# Patient Record
Sex: FEMALE | Race: WHITE | NOT HISPANIC OR LATINO | Employment: FULL TIME | URBAN - METROPOLITAN AREA
[De-identification: names, ages, dates, MRNs, and addresses within clinical notes are randomized per-mention and may not be internally consistent; named-entity substitution may affect disease eponyms.]

---

## 2017-10-31 ENCOUNTER — OFFICE VISIT (OUTPATIENT)
Dept: FAMILY MEDICINE CLINIC | Facility: CLINIC | Age: 27
End: 2017-10-31

## 2017-10-31 VITALS
HEIGHT: 68 IN | HEART RATE: 80 BPM | RESPIRATION RATE: 18 BRPM | BODY MASS INDEX: 35.84 KG/M2 | SYSTOLIC BLOOD PRESSURE: 138 MMHG | TEMPERATURE: 98.6 F | WEIGHT: 236.5 LBS | DIASTOLIC BLOOD PRESSURE: 82 MMHG | OXYGEN SATURATION: 96 %

## 2017-10-31 DIAGNOSIS — R19.5 MUCUS IN STOOL: ICD-10-CM

## 2017-10-31 DIAGNOSIS — R19.7 INTERMITTENT DIARRHEA: ICD-10-CM

## 2017-10-31 DIAGNOSIS — R63.5 ABNORMAL WEIGHT GAIN: Primary | ICD-10-CM

## 2017-10-31 DIAGNOSIS — Z23 NEED FOR TDAP VACCINATION: ICD-10-CM

## 2017-10-31 DIAGNOSIS — K92.1: ICD-10-CM

## 2017-10-31 PROCEDURE — 99204 OFFICE O/P NEW MOD 45 MIN: CPT | Performed by: NURSE PRACTITIONER

## 2017-10-31 PROCEDURE — 90471 IMMUNIZATION ADMIN: CPT | Performed by: NURSE PRACTITIONER

## 2017-10-31 PROCEDURE — 90715 TDAP VACCINE 7 YRS/> IM: CPT | Performed by: NURSE PRACTITIONER

## 2017-10-31 RX ORDER — NORETHINDRONE ACETATE AND ETHINYL ESTRADIOL AND FERROUS FUMARATE 1MG-20(24)
1 KIT ORAL DAILY
COMMUNITY
End: 2021-01-04

## 2017-10-31 NOTE — PROGRESS NOTES
Subjective   Ailyn Serrano is a 26 y.o. female.     History of Present Illness   Ailyn Serrano 26 y.o. female who presents for evaluation of intermittent diarrhea and bloating. Symptoms have been present for a few months .  The condition is aggravated by nothing identified . she is experiencing mucus in stools and blood in stool.  Alleviating factors are nothing with no change in symptoms . Patient denies fever, chills, GI symptoms waking them up, abdominal pain, nausea, reflux and vomiting her past medical history is notable for no prior issures.  Patient denies recent travel.  Patient reports father has irritable bowel issues.  Patient reports intermittent episodes of diarrhea, mucus or yousif blood in stool but symptoms do not always occur at the same time.  Her stool is more loose in general even when she is not having diarrhea.      Patient reports she has gained 80 lbs since graduating [employer].  She reports working out regularly.  She has been doing boot camp for 8 weeks. 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  She has not been able to lose weight. She would like to have thyroid checked.     Last PAP was July 2017 and was normal.  LMP was last week.   The following portions of the patient's history were reviewed and updated as appropriate: allergies, current medications, past family history, past medical history, past social history, past surgical history and problem list.    Review of Systems   Constitutional: Positive for unexpected weight change. Negative for chills and fever.   Gastrointestinal: Positive for blood in stool and diarrhea. Negative for abdominal distention, abdominal pain, anal bleeding, constipation, nausea, rectal pain and vomiting.       Objective   Physical Exam   Constitutional: She is oriented to person, place, and time. She appears well-developed and well-nourished.   Neck: Carotid bruit is not present. No thyroid mass present.   Cardiovascular: Normal rate and regular  rhythm.    Pulmonary/Chest: Effort normal and breath sounds normal.   Neurological: She is alert and oriented to person, place, and time.   Psychiatric: She has a normal mood and affect. Judgment normal.   Nursing note and vitals reviewed.      Assessment/Plan   Ailyn was seen today for gi problem and diarrhea.    Diagnoses and all orders for this visit:    Abnormal weight gain  -     Comprehensive metabolic panel  -     Lipid panel  -     CBC and Differential  -     TSH  -     T4, Free    Need for Tdap vaccination  -     Tdap Vaccine Greater Than or Equal To 8yo IM    Intermittent diarrhea  -     Ambulatory Referral to Gastroenterology    Mucus in stool  -     Ambulatory Referral to Gastroenterology    Blood in stool, yousif  -     Ambulatory Referral to Gastroenterology

## 2017-11-04 LAB
ALBUMIN SERPL-MCNC: 4.1 G/DL (ref 3.5–5.2)
ALBUMIN/GLOB SERPL: 1.6 G/DL
ALP SERPL-CCNC: 70 U/L (ref 39–117)
ALT SERPL-CCNC: 36 U/L (ref 1–33)
AST SERPL-CCNC: 22 U/L (ref 1–32)
BASOPHILS # BLD AUTO: 0.04 10*3/MM3 (ref 0–0.2)
BASOPHILS NFR BLD AUTO: 0.6 % (ref 0–1.5)
BILIRUB SERPL-MCNC: 0.4 MG/DL (ref 0.1–1.2)
BUN SERPL-MCNC: 11 MG/DL (ref 6–20)
BUN/CREAT SERPL: 17.2 (ref 7–25)
CALCIUM SERPL-MCNC: 9.2 MG/DL (ref 8.6–10.5)
CHLORIDE SERPL-SCNC: 103 MMOL/L (ref 98–107)
CHOLEST SERPL-MCNC: 153 MG/DL (ref 0–200)
CO2 SERPL-SCNC: 25.1 MMOL/L (ref 22–29)
CREAT SERPL-MCNC: 0.64 MG/DL (ref 0.57–1)
EOSINOPHIL # BLD AUTO: 0.08 10*3/MM3 (ref 0–0.7)
EOSINOPHIL NFR BLD AUTO: 1.2 % (ref 0.3–6.2)
ERYTHROCYTE [DISTWIDTH] IN BLOOD BY AUTOMATED COUNT: 13.7 % (ref 11.7–13)
GFR SERPLBLD CREATININE-BSD FMLA CKD-EPI: 114 ML/MIN/1.73
GFR SERPLBLD CREATININE-BSD FMLA CKD-EPI: 138 ML/MIN/1.73
GLOBULIN SER CALC-MCNC: 2.6 GM/DL
GLUCOSE SERPL-MCNC: 92 MG/DL (ref 65–99)
HCT VFR BLD AUTO: 38 % (ref 35.6–45.5)
HDLC SERPL-MCNC: 56 MG/DL (ref 40–60)
HGB BLD-MCNC: 12 G/DL (ref 11.9–15.5)
IMM GRANULOCYTES # BLD: 0 10*3/MM3 (ref 0–0.03)
IMM GRANULOCYTES NFR BLD: 0 % (ref 0–0.5)
LDLC SERPL CALC-MCNC: 82 MG/DL (ref 0–100)
LYMPHOCYTES # BLD AUTO: 1.74 10*3/MM3 (ref 0.9–4.8)
LYMPHOCYTES NFR BLD AUTO: 27 % (ref 19.6–45.3)
MCH RBC QN AUTO: 28.7 PG (ref 26.9–32)
MCHC RBC AUTO-ENTMCNC: 31.6 G/DL (ref 32.4–36.3)
MCV RBC AUTO: 90.9 FL (ref 80.5–98.2)
MONOCYTES # BLD AUTO: 0.56 10*3/MM3 (ref 0.2–1.2)
MONOCYTES NFR BLD AUTO: 8.7 % (ref 5–12)
NEUTROPHILS # BLD AUTO: 4.03 10*3/MM3 (ref 1.9–8.1)
NEUTROPHILS NFR BLD AUTO: 62.5 % (ref 42.7–76)
PLATELET # BLD AUTO: 256 10*3/MM3 (ref 140–500)
POTASSIUM SERPL-SCNC: 4.1 MMOL/L (ref 3.5–5.2)
PROT SERPL-MCNC: 6.7 G/DL (ref 6–8.5)
RBC # BLD AUTO: 4.18 10*6/MM3 (ref 3.9–5.2)
SODIUM SERPL-SCNC: 139 MMOL/L (ref 136–145)
T4 FREE SERPL-MCNC: 1.17 NG/DL (ref 0.93–1.7)
TRIGL SERPL-MCNC: 74 MG/DL (ref 0–150)
TSH SERPL DL<=0.005 MIU/L-ACNC: 2.56 MIU/ML (ref 0.27–4.2)
VLDLC SERPL CALC-MCNC: 14.8 MG/DL (ref 5–40)
WBC # BLD AUTO: 6.45 10*3/MM3 (ref 4.5–10.7)

## 2017-11-15 ENCOUNTER — OFFICE VISIT (OUTPATIENT)
Dept: GASTROENTEROLOGY | Facility: CLINIC | Age: 27
End: 2017-11-15

## 2017-11-15 VITALS
DIASTOLIC BLOOD PRESSURE: 70 MMHG | HEIGHT: 68 IN | WEIGHT: 233 LBS | SYSTOLIC BLOOD PRESSURE: 110 MMHG | TEMPERATURE: 98 F | BODY MASS INDEX: 35.31 KG/M2

## 2017-11-15 DIAGNOSIS — R10.9 ABDOMINAL CRAMPING: ICD-10-CM

## 2017-11-15 DIAGNOSIS — K59.1 FUNCTIONAL DIARRHEA: Primary | ICD-10-CM

## 2017-11-15 DIAGNOSIS — K92.1 HEMATOCHEZIA: ICD-10-CM

## 2017-11-15 DIAGNOSIS — R14.0 BLOATING: ICD-10-CM

## 2017-11-15 PROCEDURE — 99204 OFFICE O/P NEW MOD 45 MIN: CPT | Performed by: INTERNAL MEDICINE

## 2017-11-15 RX ORDER — DICYCLOMINE HYDROCHLORIDE 10 MG/1
10 CAPSULE ORAL EVERY 6 HOURS PRN
Qty: 90 CAPSULE | Refills: 1 | Status: SHIPPED | OUTPATIENT
Start: 2017-11-15 | End: 2019-03-27

## 2017-11-15 NOTE — PROGRESS NOTES
Chief Complaint   Patient presents with   • Diarrhea     Referral   • Rectal Bleeding     two times       Subjective     HPI    Ailyn Serrano is a 26 y.o. female with a past medical history noted below who presents for evaluation of diarrhea and rectal bleeding. Symptoms started about 3 months ago with no obvious precipitants.  Her baseline was to have about 1 bowel movement every day.  There is no hard stools or constipation.  However this progressed to about 2-3 bowel movements daily.  There is some urgency.  She is now having a bowel movement frequently after lunchtime.  She is having a little bit of lower abdominal cramping preceding the bowel movements that is relieved afterwards.  She has had some significant mucus with her stools.  On 2 discrete episodes, she did notice some blood in her stool and with wiping.  It was a small amount.  It was not associated with any pain or any particular Lex hard bowel movement.  She has not had any further.  She did recently have lab work with her primary care physician her hemoglobin was normal.    Not take any excess NSAIDs or herbal supplements.  She is not had any abdominal surgeries.  She works as a inpatient dietitian.  No family history of GI malignancy or IBD.  she does not smoke or drink excess alcohol.        History reviewed. No pertinent past medical history.      Current Outpatient Prescriptions:   •  norethindrone-ethinyl estradiol-ferrous fumarate (LOESTIN 24 FE) 1-20 MG-MCG(24) per tablet, Take 1 tablet by mouth Daily., Disp: , Rfl:   •  dicyclomine (BENTYL) 10 MG capsule, Take 1 capsule by mouth Every 6 (Six) Hours As Needed (diarrhea)., Disp: 90 capsule, Rfl: 1    No Known Allergies    Social History     Social History   • Marital status:      Spouse name: N/A   • Number of children: N/A   • Years of education: N/A     Occupational History   • Not on file.     Social History Main Topics   • Smoking status: Never Smoker   • Smokeless tobacco: Never  Used   • Alcohol use Yes   • Drug use: No   • Sexual activity: Not on file     Other Topics Concern   • Not on file     Social History Narrative       Family History   Problem Relation Age of Onset   • Nephrolithiasis Father    • Hypertension Father    • Heart disease Other    • Cancer Other    • Lung disease Other    • Depression Other    • Alcohol abuse Other        Review of Systems   Constitutional: Negative for activity change, appetite change and fatigue.   HENT: Negative for sore throat and trouble swallowing.    Respiratory: Negative.    Cardiovascular: Negative.    Gastrointestinal: Positive for anal bleeding and diarrhea. Negative for abdominal distention, abdominal pain and blood in stool.   Endocrine: Negative for cold intolerance and heat intolerance.   Genitourinary: Negative for difficulty urinating, dysuria and frequency.   Musculoskeletal: Negative for arthralgias, back pain and myalgias.   Skin: Negative.    Hematological: Negative for adenopathy. Does not bruise/bleed easily.   All other systems reviewed and are negative.      Objective     Vitals:    11/15/17 1607   BP: 110/70   Temp: 98 °F (36.7 °C)     Last 2 weights    11/15/17  1607   Weight: 233 lb (106 kg)     Body mass index is 35.43 kg/(m^2).    Physical Exam   Constitutional: She is oriented to person, place, and time. She appears well-developed and well-nourished. No distress.   HENT:   Head: Normocephalic and atraumatic.   Right Ear: External ear normal.   Left Ear: External ear normal.   Nose: Nose normal.   Mouth/Throat: Oropharynx is clear and moist.   Eyes: Conjunctivae and EOM are normal. Right eye exhibits no discharge. Left eye exhibits no discharge. No scleral icterus.   Neck: Normal range of motion. Neck supple. No thyromegaly present.   No supraclavicular adenopathy   Cardiovascular: Normal rate, regular rhythm, normal heart sounds and intact distal pulses.  Exam reveals no gallop.    No murmur heard.  No lower extremity  edema   Pulmonary/Chest: Effort normal and breath sounds normal. No respiratory distress. She has no wheezes.   Abdominal: Soft. Normal appearance and bowel sounds are normal. She exhibits no distension and no mass. There is no hepatosplenomegaly. There is no tenderness. There is no rigidity, no rebound and no guarding.   Obese   Genitourinary:   Genitourinary Comments: Rectal exam with no external hemorrhoids, no palpable internal masses, no blood   Musculoskeletal: Normal range of motion. She exhibits no edema or tenderness.   No atrophy of upper or lower extremities.  Normal digits and nails of both hands.   Lymphadenopathy:     She has no cervical adenopathy.   Neurological: She is alert and oriented to person, place, and time. She displays no atrophy. Coordination normal.   Skin: Skin is warm and dry. No rash noted. She is not diaphoretic. No erythema.   Psychiatric: She has a normal mood and affect. Her behavior is normal. Judgment and thought content normal.   Vitals reviewed.      WBC   Date Value Ref Range Status   11/03/2017 6.45 4.50 - 10.70 10*3/mm3 Final     RBC   Date Value Ref Range Status   11/03/2017 4.18 3.90 - 5.20 10*6/mm3 Final     Hemoglobin   Date Value Ref Range Status   11/03/2017 12.0 11.9 - 15.5 g/dL Final     Hematocrit   Date Value Ref Range Status   11/03/2017 38.0 35.6 - 45.5 % Final     MCV   Date Value Ref Range Status   11/03/2017 90.9 80.5 - 98.2 fL Final     MCH   Date Value Ref Range Status   11/03/2017 28.7 26.9 - 32.0 pg Final     MCHC   Date Value Ref Range Status   11/03/2017 31.6 (L) 32.4 - 36.3 g/dL Final     RDW   Date Value Ref Range Status   11/03/2017 13.7 (H) 11.7 - 13.0 % Final     Platelets   Date Value Ref Range Status   11/03/2017 256 140 - 500 10*3/mm3 Final     Neutrophil Rel %   Date Value Ref Range Status   11/03/2017 62.5 42.7 - 76.0 % Final     Lymphocyte Rel %   Date Value Ref Range Status   11/03/2017 27.0 19.6 - 45.3 % Final     Monocyte Rel %   Date Value  Ref Range Status   11/03/2017 8.7 5.0 - 12.0 % Final     Eosinophil Rel %   Date Value Ref Range Status   11/03/2017 1.2 0.3 - 6.2 % Final     Basophil Rel %   Date Value Ref Range Status   11/03/2017 0.6 0.0 - 1.5 % Final     Neutrophils Absolute   Date Value Ref Range Status   11/03/2017 4.03 1.90 - 8.10 10*3/mm3 Final     Lymphocytes Absolute   Date Value Ref Range Status   11/03/2017 1.74 0.90 - 4.80 10*3/mm3 Final     Monocytes Absolute   Date Value Ref Range Status   11/03/2017 0.56 0.20 - 1.20 10*3/mm3 Final     Eosinophils Absolute   Date Value Ref Range Status   11/03/2017 0.08 0.00 - 0.70 10*3/mm3 Final     Basophils Absolute   Date Value Ref Range Status   11/03/2017 0.04 0.00 - 0.20 10*3/mm3 Final       Sodium   Date Value Ref Range Status   11/03/2017 139 136 - 145 mmol/L Final     Potassium   Date Value Ref Range Status   11/03/2017 4.1 3.5 - 5.2 mmol/L Final     Total CO2   Date Value Ref Range Status   11/03/2017 25.1 22.0 - 29.0 mmol/L Final     Chloride   Date Value Ref Range Status   11/03/2017 103 98 - 107 mmol/L Final     Creatinine   Date Value Ref Range Status   11/03/2017 0.64 0.57 - 1.00 mg/dL Final     BUN   Date Value Ref Range Status   11/03/2017 11 6 - 20 mg/dL Final     BUN/Creatinine Ratio   Date Value Ref Range Status   11/03/2017 17.2 7.0 - 25.0 Final     Calcium   Date Value Ref Range Status   11/03/2017 9.2 8.6 - 10.5 mg/dL Final     eGFR Non  Am   Date Value Ref Range Status   11/03/2017 114 >60 mL/min/1.73 Final     Alkaline Phosphatase   Date Value Ref Range Status   11/03/2017 70 39 - 117 U/L Final     ALT (SGPT)   Date Value Ref Range Status   11/03/2017 36 (H) 1 - 33 U/L Final     AST (SGOT)   Date Value Ref Range Status   11/03/2017 22 1 - 32 U/L Final     Total Bilirubin   Date Value Ref Range Status   11/03/2017 0.4 0.1 - 1.2 mg/dL Final     Albumin   Date Value Ref Range Status   11/03/2017 4.10 3.50 - 5.20 g/dL Final     A/G Ratio   Date Value Ref Range Status    11/03/2017 1.6 g/dL Final         Imaging Results (last 7 days)     ** No results found for the last 168 hours. **            No notes on file    Assessment/Plan    1. Diarrhea: more frequent stools that are an acute change from her baseline.  ? IBS vs colitis  2. Abdominal cramping: associated with above, precedes her BMs and resolves after  3. Hematochezia: 2 separate episodes of blood in her stool during this time, recent Hb with PCP was normal  4. Bloating: associated with above    Plan  -start low dose dicyclomine  -daily fiber supplementation  -monitor stools for further bleeding-- if this persists will need colonoscopy for further evaluation  -Back to the office in about 3 months to reassess     Ailyn was seen today for diarrhea and rectal bleeding.    Diagnoses and all orders for this visit:    Functional diarrhea    Bloating    Abdominal cramping    Hematochezia    Other orders  -     dicyclomine (BENTYL) 10 MG capsule; Take 1 capsule by mouth Every 6 (Six) Hours As Needed (diarrhea).        I have discussed the above plan with the patient.  They verbalize understanding and are in agreement with the plan.  They have been advised to contact the office for any questions, concerns, or changes related to their health.    Dictated utilizing Dragon dictation

## 2017-11-15 NOTE — PATIENT INSTRUCTIONS
Start the dicyclomine for the diarrhea-- start with one every morning to see if this settles you down.  Can take every 6 hours as needed    Start citrucel tablets-- 2 with breakfast and dinner    Plenty of water    Monitor stools for further bleeding    For any additional questions, concerns or changes to your condition after today's office visit please contact the office at [phone].

## 2018-01-18 ENCOUNTER — OFFICE VISIT (OUTPATIENT)
Dept: FAMILY MEDICINE CLINIC | Facility: CLINIC | Age: 28
End: 2018-01-18

## 2018-01-18 VITALS
DIASTOLIC BLOOD PRESSURE: 72 MMHG | SYSTOLIC BLOOD PRESSURE: 130 MMHG | OXYGEN SATURATION: 98 % | BODY MASS INDEX: 35.77 KG/M2 | HEART RATE: 85 BPM | RESPIRATION RATE: 18 BRPM | TEMPERATURE: 98.5 F | HEIGHT: 68 IN | WEIGHT: 236 LBS

## 2018-01-18 DIAGNOSIS — R20.8 DECREASED SENSATION OF LEG: Primary | ICD-10-CM

## 2018-01-18 PROCEDURE — 99212 OFFICE O/P EST SF 10 MIN: CPT | Performed by: NURSE PRACTITIONER

## 2018-01-18 NOTE — PROGRESS NOTES
Subjective   Ailyn Serrano is a 27 y.o. female.     History of Present Illness   Patient complains of left anterior lower leg numbness.  She denies pain.  The symptoms began 3 days ago.  Pain is a result of no known event.  Discomfort is described as numbness and decreased sensation of touch. Symptoms are exacerbated by nothing identified.  Evaluation to date: none. Therapy to date includes: nothing specific.  Patient is doing boot camp and reports exercises are constantly changing.        The following portions of the patient's history were reviewed and updated as appropriate: allergies, current medications, past family history, past medical history, past social history, past surgical history and problem list.    Review of Systems   Musculoskeletal: Negative for arthralgias, back pain, gait problem, joint swelling and myalgias.   Neurological: Positive for numbness. Negative for weakness.       Objective   Physical Exam   Constitutional: She is oriented to person, place, and time. She appears well-developed and well-nourished.   Pulmonary/Chest: Effort normal.   Musculoskeletal:        Left lower leg: She exhibits no tenderness, no bony tenderness, no swelling, no edema, no deformity and no laceration.        Legs:  Patient reports slightly decreased sensation to touch to 5 cm area to left lateral lower extremity just below patella. No pain with ROM or palpation.    Neurological: She is alert and oriented to person, place, and time.   Psychiatric: She has a normal mood and affect. Judgment normal.   Nursing note and vitals reviewed.      Assessment/Plan   Ailyn was seen today for numbness.    Diagnoses and all orders for this visit:    Decreased sensation of leg        Patient to take motrin and rest leg for a few days.  She is to f/u if no improvement in symptoms.

## 2018-02-23 ENCOUNTER — OFFICE VISIT (OUTPATIENT)
Dept: GASTROENTEROLOGY | Facility: CLINIC | Age: 28
End: 2018-02-23

## 2018-02-23 VITALS
HEIGHT: 68 IN | WEIGHT: 232.4 LBS | TEMPERATURE: 97.9 F | SYSTOLIC BLOOD PRESSURE: 142 MMHG | DIASTOLIC BLOOD PRESSURE: 90 MMHG | BODY MASS INDEX: 35.22 KG/M2

## 2018-02-23 DIAGNOSIS — R10.9 ABDOMINAL CRAMPING: ICD-10-CM

## 2018-02-23 DIAGNOSIS — K62.5 RECTAL BLEEDING: ICD-10-CM

## 2018-02-23 DIAGNOSIS — K59.1 FUNCTIONAL DIARRHEA: Primary | ICD-10-CM

## 2018-02-23 PROCEDURE — 99214 OFFICE O/P EST MOD 30 MIN: CPT | Performed by: INTERNAL MEDICINE

## 2018-02-23 NOTE — PATIENT INSTRUCTIONS
Continue the dicyclomine-- ok to use every 6 hours as needed    Call if symptoms worsen or bleeding returns    For any additional questions, concerns or changes to your condition after today's office visit please contact the office at [phone].

## 2018-02-23 NOTE — PROGRESS NOTES
Chief Complaint   Patient presents with   • Follow-up     3 month follow up      Subjective     HPI  Ailyn Serrano is a 27 y.o. female who presents for follow-up of diarrhea, abdominal cramping, and a few intermittent episodes of rectal bleeding.  At her last visit I had her start fiber supplementation and dicyclomine for her symptoms.    Taking the dicyclomine once in the morning.  He is not experiencing any bothersome side effects from this  Also taking citrucel.  Doing well.  She is down to one to 2-controlled bowel movements per day.  She is not having any significant cramping.  She is not having any mucous passage as she was previously.  She is not having any abdominal pain, nausea, or vomiting.  She is not describing any excess issues with constipation.  She has not had any further episodes of rectal bleeding since her last visit 3 months ago.        History reviewed. No pertinent past medical history.    Social History     Social History   • Marital status:      Spouse name: N/A   • Number of children: N/A   • Years of education: N/A     Social History Main Topics   • Smoking status: Never Smoker   • Smokeless tobacco: Never Used   • Alcohol use Yes     2 Glasses of wine per week   • Drug use: No   • Sexual activity: Yes     Partners: Male     Birth control/ protection: Condom, Other      Comment: Birth control     Other Topics Concern   • None     Social History Narrative         Current Outpatient Prescriptions:   •  dicyclomine (BENTYL) 10 MG capsule, Take 1 capsule by mouth Every 6 (Six) Hours As Needed (diarrhea)., Disp: 90 capsule, Rfl: 1  •  norethindrone-ethinyl estradiol-ferrous fumarate (LOESTIN 24 FE) 1-20 MG-MCG(24) per tablet, Take 1 tablet by mouth Daily., Disp: , Rfl:     Review of Systems   Constitutional: Negative for activity change, appetite change, chills, fatigue and fever.   HENT: Negative for sore throat and trouble swallowing.    Respiratory: Negative.    Cardiovascular:  Negative.    Gastrointestinal: Negative for abdominal distention, abdominal pain, anal bleeding, blood in stool, constipation, diarrhea, nausea and vomiting.   Endocrine: Negative for cold intolerance and heat intolerance.   Genitourinary: Negative for difficulty urinating, dysuria, frequency and hematuria.   Musculoskeletal: Negative for arthralgias, back pain and myalgias.   Skin: Negative.    Hematological: Negative for adenopathy. Does not bruise/bleed easily.   All other systems reviewed and are negative.      Objective   Vitals:    02/23/18 1345   BP: 142/90   Temp: 97.9 °F (36.6 °C)     Last Weight    02/23/18  1345   Weight: 105 kg (232 lb 6.4 oz)     Body mass index is 35.34 kg/(m^2).      Physical Exam   Constitutional: She is oriented to person, place, and time. She appears well-developed and well-nourished. No distress.   HENT:   Head: Normocephalic and atraumatic.   Right Ear: External ear normal.   Left Ear: External ear normal.   Nose: Nose normal.   Mouth/Throat: Oropharynx is clear and moist.   Eyes: Conjunctivae and EOM are normal. Right eye exhibits no discharge. Left eye exhibits no discharge. No scleral icterus.   Neck: Normal range of motion. Neck supple. No thyromegaly present.   No supraclavicular adenopathy   Cardiovascular: Normal rate, regular rhythm, normal heart sounds and intact distal pulses.  Exam reveals no gallop.    No murmur heard.  No lower extremity edema   Pulmonary/Chest: Effort normal and breath sounds normal. No respiratory distress. She has no wheezes.   Abdominal: Soft. Normal appearance and bowel sounds are normal. She exhibits no distension and no mass. There is no hepatosplenomegaly. There is no tenderness. There is no rigidity, no rebound and no guarding. No hernia.   Obese     Genitourinary:   Genitourinary Comments: Rectal exam deferred   Musculoskeletal: Normal range of motion. She exhibits no edema or tenderness.   No atrophy of upper or lower extremities.  Normal  digits and nails of both hands.   Lymphadenopathy:     She has no cervical adenopathy.   Neurological: She is alert and oriented to person, place, and time. She displays no atrophy. Coordination normal.   Skin: Skin is warm and dry. No rash noted. She is not diaphoretic. No erythema.   Psychiatric: She has a normal mood and affect. Her behavior is normal. Judgment and thought content normal.   Vitals reviewed.      WBC   Date Value Ref Range Status   11/03/2017 6.45 4.50 - 10.70 10*3/mm3 Final     RBC   Date Value Ref Range Status   11/03/2017 4.18 3.90 - 5.20 10*6/mm3 Final     Hemoglobin   Date Value Ref Range Status   11/03/2017 12.0 11.9 - 15.5 g/dL Final     Hematocrit   Date Value Ref Range Status   11/03/2017 38.0 35.6 - 45.5 % Final     MCV   Date Value Ref Range Status   11/03/2017 90.9 80.5 - 98.2 fL Final     MCH   Date Value Ref Range Status   11/03/2017 28.7 26.9 - 32.0 pg Final     MCHC   Date Value Ref Range Status   11/03/2017 31.6 (L) 32.4 - 36.3 g/dL Final     RDW   Date Value Ref Range Status   11/03/2017 13.7 (H) 11.7 - 13.0 % Final     Platelets   Date Value Ref Range Status   11/03/2017 256 140 - 500 10*3/mm3 Final     Neutrophil Rel %   Date Value Ref Range Status   11/03/2017 62.5 42.7 - 76.0 % Final     Lymphocyte Rel %   Date Value Ref Range Status   11/03/2017 27.0 19.6 - 45.3 % Final     Monocyte Rel %   Date Value Ref Range Status   11/03/2017 8.7 5.0 - 12.0 % Final     Eosinophil Rel %   Date Value Ref Range Status   11/03/2017 1.2 0.3 - 6.2 % Final     Basophil Rel %   Date Value Ref Range Status   11/03/2017 0.6 0.0 - 1.5 % Final     Neutrophils Absolute   Date Value Ref Range Status   11/03/2017 4.03 1.90 - 8.10 10*3/mm3 Final     Lymphocytes Absolute   Date Value Ref Range Status   11/03/2017 1.74 0.90 - 4.80 10*3/mm3 Final     Monocytes Absolute   Date Value Ref Range Status   11/03/2017 0.56 0.20 - 1.20 10*3/mm3 Final     Eosinophils Absolute   Date Value Ref Range Status    11/03/2017 0.08 0.00 - 0.70 10*3/mm3 Final     Basophils Absolute   Date Value Ref Range Status   11/03/2017 0.04 0.00 - 0.20 10*3/mm3 Final       Lab Results   Component Value Date    BUN 11 11/03/2017    CREATININE 0.64 11/03/2017    EGFRIFNONA 114 11/03/2017    EGFRIFAFRI 138 11/03/2017    BCR 17.2 11/03/2017    CO2 25.1 11/03/2017    CALCIUM 9.2 11/03/2017    PROTENTOTREF 6.7 11/03/2017    ALBUMIN 4.10 11/03/2017    LABIL2 1.6 11/03/2017    AST 22 11/03/2017    ALT 36 (H) 11/03/2017         Imaging Results (last 7 days)     ** No results found for the last 168 hours. **            Assessment/Plan    Functional diarrhea: improved with dicyclomine,fiber supplementation.  Suspect IBS symptoms    Abdominal cramping: improved as above    Rectal bleeding: no further episodes    Plan  -Continue the dicyclomine.  We discussed that it is okay to increase up to every 6 hours if she needs this.  Otherwise it is fine to continue to take it as she has been  -Continue the fiber supplementation  -We discussed the possibilities of the causes of rectal bleeding spanning from benign, such as hemorrhoidal, fissure, tear versus something more worrisome such as polyps.  The good thing is that she has not had any further rectal bleeding.  We did discuss that if this returns then I would like to see her in the office.  I do think that colonoscopy is warranted if she has any further episodes.  She verbalizes understanding.  -follow up in 1 year for med management, sooner if needed    Ailyn was seen today for follow-up.    Diagnoses and all orders for this visit:    Functional diarrhea    Abdominal cramping    Rectal bleeding      Dictated utilizing Dragon dictation

## 2019-03-27 ENCOUNTER — OFFICE VISIT (OUTPATIENT)
Dept: FAMILY MEDICINE CLINIC | Facility: CLINIC | Age: 29
End: 2019-03-27

## 2019-03-27 VITALS
TEMPERATURE: 98.7 F | WEIGHT: 233 LBS | BODY MASS INDEX: 35.31 KG/M2 | DIASTOLIC BLOOD PRESSURE: 72 MMHG | OXYGEN SATURATION: 100 % | HEIGHT: 68 IN | SYSTOLIC BLOOD PRESSURE: 122 MMHG | HEART RATE: 91 BPM | RESPIRATION RATE: 16 BRPM

## 2019-03-27 DIAGNOSIS — M79.672 ACUTE PAIN OF LEFT FOOT: Primary | ICD-10-CM

## 2019-03-27 PROCEDURE — 99213 OFFICE O/P EST LOW 20 MIN: CPT | Performed by: NURSE PRACTITIONER

## 2020-06-23 ENCOUNTER — LAB REQUISITION (OUTPATIENT)
Dept: LAB | Facility: HOSPITAL | Age: 30
End: 2020-06-23

## 2020-06-23 DIAGNOSIS — Z00.00 ENCOUNTER FOR GENERAL ADULT MEDICAL EXAMINATION WITHOUT ABNORMAL FINDINGS: ICD-10-CM

## 2020-06-23 PROCEDURE — 88305 TISSUE EXAM BY PATHOLOGIST: CPT | Performed by: OBSTETRICS & GYNECOLOGY

## 2020-06-24 LAB
CYTO UR: NORMAL
LAB AP CASE REPORT: NORMAL
LAB AP CLINICAL INFORMATION: NORMAL
PATH REPORT.FINAL DX SPEC: NORMAL
PATH REPORT.GROSS SPEC: NORMAL

## 2021-02-16 ENCOUNTER — IMMUNIZATION (OUTPATIENT)
Dept: VACCINE CLINIC | Facility: HOSPITAL | Age: 31
End: 2021-02-16

## 2021-02-16 PROCEDURE — 91300 HC SARSCOV02 VAC 30MCG/0.3ML IM: CPT | Performed by: INTERNAL MEDICINE

## 2021-02-16 PROCEDURE — 0001A: CPT | Performed by: INTERNAL MEDICINE

## 2021-03-09 ENCOUNTER — IMMUNIZATION (OUTPATIENT)
Dept: VACCINE CLINIC | Facility: HOSPITAL | Age: 31
End: 2021-03-09

## 2021-03-09 PROCEDURE — 91300 HC SARSCOV02 VAC 30MCG/0.3ML IM: CPT | Performed by: INTERNAL MEDICINE

## 2021-03-09 PROCEDURE — 0002A: CPT | Performed by: INTERNAL MEDICINE

## 2023-01-28 NOTE — PROGRESS NOTES
Subjective   Ailyn Serrano is a 28 y.o. female.     History of Present Illness   Patient complains of left foot pain. The symptoms began 2 weeks ago.  Pain is a result of no known event. Pain is located dorsal region. Discomfort is described as soreness. Symptoms are exacerbated by walking and pressure applied to area.  Evaluation to date: none. Therapy to date includes: rest.  States it is not worse, but just not improved significantly.  She has still been exercising.       The following portions of the patient's history were reviewed and updated as appropriate: allergies, current medications, past family history, past medical history, past social history, past surgical history and problem list.    Review of Systems   Musculoskeletal: Positive for arthralgias. Negative for joint swelling.   Neurological: Negative for weakness and numbness.       Objective   Physical Exam   Constitutional: She is oriented to person, place, and time. She appears well-developed and well-nourished.   Pulmonary/Chest: Effort normal.   Musculoskeletal:        Left foot: There is tenderness. There is normal range of motion, no bony tenderness, no swelling, normal capillary refill, no crepitus, no deformity and no laceration.   Mild tenderness to palpation and to flexion and dorsiflexion of toes.         Neurological: She is alert and oriented to person, place, and time.   Psychiatric: She has a normal mood and affect. Judgment normal.   Nursing note and vitals reviewed.      Assessment/Plan   Ailyn was seen today for foot pain.    Diagnoses and all orders for this visit:    Acute pain of left foot  -     diclofenac (VOLTAREN) 1 % gel gel; Apply 4 g topically to the appropriate area as directed 4 (Four) Times a Day As Needed (pain/inflammation).    Patient will decrease exercise for the next week and do only low impact.  She will wear ace wrap and supportive shoes.  To use Voltaren gel TID-QID.  F/u if no improvement in the next couple  of weeks.             front side of head/aching/band-like

## 2023-02-16 ENCOUNTER — OFFICE VISIT (OUTPATIENT)
Dept: FAMILY MEDICINE CLINIC | Facility: CLINIC | Age: 33
End: 2023-02-16
Payer: COMMERCIAL

## 2023-02-16 VITALS
SYSTOLIC BLOOD PRESSURE: 118 MMHG | HEIGHT: 68 IN | HEART RATE: 71 BPM | BODY MASS INDEX: 41.68 KG/M2 | DIASTOLIC BLOOD PRESSURE: 76 MMHG | TEMPERATURE: 96.5 F | OXYGEN SATURATION: 99 % | WEIGHT: 275 LBS

## 2023-02-16 DIAGNOSIS — E28.2 PCOS (POLYCYSTIC OVARIAN SYNDROME): Primary | ICD-10-CM

## 2023-02-16 PROCEDURE — 99213 OFFICE O/P EST LOW 20 MIN: CPT | Performed by: NURSE PRACTITIONER

## 2023-02-16 NOTE — PROGRESS NOTES
Subjective   Ailyn Serrano is a 32 y.o. female.     History of Present Illness   Ailyn Serrano 32 y.o. female who presents today for a new patient appointment.    she has a history of   Patient Active Problem List   Diagnosis   • Functional diarrhea   • Abdominal cramping   • Rectal bleeding   • PCOS (polycystic ovarian syndrome)   .  she is here to re-establish care I reviewed the PFSH recorded today by my MA/LPN staff.   she   She has been feeling well.  Was diagnosed with PCOS at fertility clinic and was on metformin until she became pregnant.  She had her son in July of 2022.  She has been unable to lose weight and was wondering if she would benefit from being back on metformin.    The following portions of the patient's history were reviewed and updated as appropriate: allergies, current medications, past family history, past medical history, past social history, past surgical history and problem list.    Review of Systems   Constitutional: Negative for unexpected weight change.   Respiratory: Negative for shortness of breath.    Cardiovascular: Negative for chest pain and palpitations.   Psychiatric/Behavioral: Negative for behavioral problems.       Objective   Physical Exam  Vitals and nursing note reviewed.   Constitutional:       Appearance: She is well-developed.   Neck:      Thyroid: No thyromegaly.      Vascular: No carotid bruit.   Cardiovascular:      Rate and Rhythm: Normal rate and regular rhythm.   Pulmonary:      Effort: Pulmonary effort is normal.      Breath sounds: Normal breath sounds.   Neurological:      Mental Status: She is alert and oriented to person, place, and time.   Psychiatric:         Mood and Affect: Mood normal.         Behavior: Behavior normal.         Thought Content: Thought content normal.         Judgment: Judgment normal.         Assessment & Plan   Diagnoses and all orders for this visit:    1. PCOS (polycystic ovarian syndrome) (Primary)  -     Comprehensive metabolic  panel  -     Lipid panel  -     CBC and Differential  -     TSH  -     T4, free  -     Hemoglobin A1c    Other orders  -     metFORMIN (Glucophage) 500 MG tablet; Take 1 tablet by mouth 2 (Two) Times a Day With Meals.  Dispense: 180 tablet; Refill: 0    She can increased metformin to 1000 mg BID in a month if tolerating it well.  She will let me know if she increases so dose can be adjusted.

## 2023-02-17 LAB
ALBUMIN SERPL-MCNC: 4.7 G/DL (ref 3.5–5.2)
ALBUMIN/GLOB SERPL: 2.1 G/DL
ALP SERPL-CCNC: 145 U/L (ref 39–117)
ALT SERPL-CCNC: 19 U/L (ref 1–33)
AST SERPL-CCNC: 14 U/L (ref 1–32)
BASOPHILS # BLD AUTO: 0.04 10*3/MM3 (ref 0–0.2)
BASOPHILS NFR BLD AUTO: 0.6 % (ref 0–1.5)
BILIRUB SERPL-MCNC: 0.3 MG/DL (ref 0–1.2)
BUN SERPL-MCNC: 10 MG/DL (ref 6–20)
BUN/CREAT SERPL: 17.5 (ref 7–25)
CALCIUM SERPL-MCNC: 9.5 MG/DL (ref 8.6–10.5)
CHLORIDE SERPL-SCNC: 103 MMOL/L (ref 98–107)
CHOLEST SERPL-MCNC: 187 MG/DL (ref 0–200)
CO2 SERPL-SCNC: 26.6 MMOL/L (ref 22–29)
CREAT SERPL-MCNC: 0.57 MG/DL (ref 0.57–1)
EGFRCR SERPLBLD CKD-EPI 2021: 125.6 ML/MIN/1.73
EOSINOPHIL # BLD AUTO: 0.08 10*3/MM3 (ref 0–0.4)
EOSINOPHIL NFR BLD AUTO: 1.2 % (ref 0.3–6.2)
ERYTHROCYTE [DISTWIDTH] IN BLOOD BY AUTOMATED COUNT: 12.8 % (ref 12.3–15.4)
GLOBULIN SER CALC-MCNC: 2.2 GM/DL
GLUCOSE SERPL-MCNC: 87 MG/DL (ref 65–99)
HBA1C MFR BLD: 5.3 % (ref 4.8–5.6)
HCT VFR BLD AUTO: 35.9 % (ref 34–46.6)
HDLC SERPL-MCNC: 55 MG/DL (ref 40–60)
HGB BLD-MCNC: 11.9 G/DL (ref 12–15.9)
IMM GRANULOCYTES # BLD AUTO: 0.02 10*3/MM3 (ref 0–0.05)
IMM GRANULOCYTES NFR BLD AUTO: 0.3 % (ref 0–0.5)
LDLC SERPL CALC-MCNC: 116 MG/DL (ref 0–100)
LYMPHOCYTES # BLD AUTO: 2.07 10*3/MM3 (ref 0.7–3.1)
LYMPHOCYTES NFR BLD AUTO: 30.1 % (ref 19.6–45.3)
MCH RBC QN AUTO: 28.3 PG (ref 26.6–33)
MCHC RBC AUTO-ENTMCNC: 33.1 G/DL (ref 31.5–35.7)
MCV RBC AUTO: 85.5 FL (ref 79–97)
MONOCYTES # BLD AUTO: 0.49 10*3/MM3 (ref 0.1–0.9)
MONOCYTES NFR BLD AUTO: 7.1 % (ref 5–12)
NEUTROPHILS # BLD AUTO: 4.18 10*3/MM3 (ref 1.7–7)
NEUTROPHILS NFR BLD AUTO: 60.7 % (ref 42.7–76)
NRBC BLD AUTO-RTO: 0 /100 WBC (ref 0–0.2)
PLATELET # BLD AUTO: 291 10*3/MM3 (ref 140–450)
POTASSIUM SERPL-SCNC: 4.4 MMOL/L (ref 3.5–5.2)
PROT SERPL-MCNC: 6.9 G/DL (ref 6–8.5)
RBC # BLD AUTO: 4.2 10*6/MM3 (ref 3.77–5.28)
SODIUM SERPL-SCNC: 139 MMOL/L (ref 136–145)
T4 FREE SERPL-MCNC: 1.35 NG/DL (ref 0.93–1.7)
TRIGL SERPL-MCNC: 86 MG/DL (ref 0–150)
TSH SERPL DL<=0.005 MIU/L-ACNC: 2.71 UIU/ML (ref 0.27–4.2)
VLDLC SERPL CALC-MCNC: 16 MG/DL (ref 5–40)
WBC # BLD AUTO: 6.88 10*3/MM3 (ref 3.4–10.8)

## 2023-02-20 DIAGNOSIS — R74.8 ELEVATED ALKALINE PHOSPHATASE LEVEL: Primary | ICD-10-CM

## 2023-05-12 ENCOUNTER — TELEPHONE (OUTPATIENT)
Dept: FAMILY MEDICINE CLINIC | Facility: CLINIC | Age: 33
End: 2023-05-12
Payer: COMMERCIAL

## 2023-05-12 NOTE — TELEPHONE ENCOUNTER
----- Message from Guadalupe Greenwood MA sent at 5/12/2023  1:35 PM EDT -----  Regarding: FW: Medication  Contact: [phone]    ----- Message -----  From: Ailyn Serrano  Sent: 5/12/2023  10:36 AM EDT  To: [facility]  Subject: Medication                                       Per my last visit with Beena Palmer, I was supposed to let her know if I upped my dose of metformin. I am now up to 2000 mg per day so she can adjust my refills/prescription please.     Thank you,    Ailyn

## 2024-05-25 ENCOUNTER — READMISSION MANAGEMENT (OUTPATIENT)
Dept: CALL CENTER | Facility: HOSPITAL | Age: 34
End: 2024-05-25
Payer: COMMERCIAL

## 2024-05-25 NOTE — OUTREACH NOTE
Prep Survey      Flowsheet Row Responses   [facility] facility patient discharged from? Non-BH   Is LACE score < 7 ? Non-BH Discharge   Eligibility Not Eligible   What are the reasons patient is not eligible? Other  [pregnancy related]   Does the patient have one of the following disease processes/diagnoses(primary or secondary)? Other   Prep survey completed? Yes            FILI GARCIA - Registered Nurse

## 2025-06-02 ENCOUNTER — OFFICE VISIT (OUTPATIENT)
Dept: FAMILY MEDICINE CLINIC | Facility: CLINIC | Age: 35
End: 2025-06-02
Payer: COMMERCIAL

## 2025-06-02 VITALS
SYSTOLIC BLOOD PRESSURE: 122 MMHG | TEMPERATURE: 98.2 F | HEIGHT: 68 IN | HEART RATE: 98 BPM | DIASTOLIC BLOOD PRESSURE: 68 MMHG | WEIGHT: 287.8 LBS | BODY MASS INDEX: 43.62 KG/M2 | OXYGEN SATURATION: 97 %

## 2025-06-02 DIAGNOSIS — Z71.3 WEIGHT LOSS COUNSELING, ENCOUNTER FOR: Primary | ICD-10-CM

## 2025-06-02 DIAGNOSIS — E28.2 PCOS (POLYCYSTIC OVARIAN SYNDROME): ICD-10-CM

## 2025-06-02 RX ORDER — ZINC OXIDE 13 %
CREAM (GRAM) TOPICAL DAILY
COMMUNITY

## 2025-06-02 NOTE — PROGRESS NOTES
Subjective   Ailyn Serrano is a 34 y.o. female.     History of Present Illness     The following portions of the patient's history were reviewed and updated as appropriate: allergies, current medications, past family history, past medical history, past social history, past surgical history, and problem list.    Review of Systems    Objective   Physical Exam    Assessment & Plan   Diagnoses and all orders for this visit:    1. Body mass index (BMI) of 40.0 to 44.9 in adult (Primary)  -     Semaglutide-Weight Management 0.25 MG/0.5ML solution auto-injector; Inject 0.5 mL under the skin into the appropriate area as directed 1 (One) Time Per Week.  Dispense: 2 mL; Refill: 0    2. Weight loss counseling, encounter for  -     Semaglutide-Weight Management 0.25 MG/0.5ML solution auto-injector; Inject 0.5 mL under the skin into the appropriate area as directed 1 (One) Time Per Week.  Dispense: 2 mL; Refill: 0    3. PCOS (polycystic ovarian syndrome)  -     Comprehensive metabolic panel  -     Lipid panel  -     CBC and Differential  -     TSH  -     T4, free  -     Hemoglobin A1c

## 2025-06-02 NOTE — PROGRESS NOTES
Subjective   Ailyn Serrano is a 34 y.o. female.     Weight Loss  Pertinent negative symptoms include no chest pain.   Weight Management  Weight:  Increased  Weight change (lbs):  30  Weight loss treatment:  Portion control and increasing exercise  Treatment barriers:  None  Physical activity tolerance:  Stable  Energy level:  Unchanged  Additional information:  PCOS     Polycystic Ovary Syndrome  Weight Loss  She was previously on metformin for PCOS but did not notice any improvement in weight loss.  She would like to try GLP-1 medication for weight loss such as Wegovy or Mounjaro.  She does not have hx of medullary thyroid cancer or pancreatitis.    The following portions of the patient's history were reviewed and updated as appropriate: allergies, current medications, past family history, past medical history, past social history, past surgical history, and problem list.    Review of Systems   Constitutional:  Positive for unexpected weight change and weight loss.   Respiratory:  Negative for shortness of breath.    Cardiovascular:  Negative for chest pain and palpitations.   Psychiatric/Behavioral:  Negative for behavioral problems.        Objective   Physical Exam  Vitals and nursing note reviewed.   Constitutional:       Appearance: She is well-developed.   Neck:      Vascular: No carotid bruit.   Cardiovascular:      Rate and Rhythm: Normal rate and regular rhythm.   Pulmonary:      Effort: Pulmonary effort is normal.      Breath sounds: Normal breath sounds.   Neurological:      Mental Status: She is alert and oriented to person, place, and time.   Psychiatric:         Mood and Affect: Mood normal.         Behavior: Behavior normal.         Thought Content: Thought content normal.         Judgment: Judgment normal.         Assessment & Plan   Diagnoses and all orders for this visit:    1. Weight loss counseling, encounter for (Primary)  -     Semaglutide-Weight Management 0.25 MG/0.5ML solution  auto-injector; Inject 0.5 mL under the skin into the appropriate area as directed 1 (One) Time Per Week.  Dispense: 2 mL; Refill: 0    2. Body mass index (BMI) of 40.0 to 44.9 in adult  -     Semaglutide-Weight Management 0.25 MG/0.5ML solution auto-injector; Inject 0.5 mL under the skin into the appropriate area as directed 1 (One) Time Per Week.  Dispense: 2 mL; Refill: 0    3. PCOS (polycystic ovarian syndrome)  -     Comprehensive metabolic panel  -     Lipid panel  -     CBC and Differential  -     TSH  -     T4, free  -     Hemoglobin A1c          She will set up lab appt.   She will schedule 4 week f/u after she starts the medication.

## 2025-06-05 DIAGNOSIS — Z71.3 WEIGHT LOSS COUNSELING, ENCOUNTER FOR: ICD-10-CM

## 2025-06-18 LAB
ALBUMIN SERPL-MCNC: 4.5 G/DL (ref 3.9–4.9)
ALP SERPL-CCNC: 112 IU/L (ref 44–121)
ALT SERPL-CCNC: 17 IU/L (ref 0–32)
AST SERPL-CCNC: 16 IU/L (ref 0–40)
BASOPHILS # BLD AUTO: 0.1 X10E3/UL (ref 0–0.2)
BASOPHILS NFR BLD AUTO: 1 %
BILIRUB SERPL-MCNC: 0.3 MG/DL (ref 0–1.2)
BUN SERPL-MCNC: 12 MG/DL (ref 6–20)
BUN/CREAT SERPL: 17 (ref 9–23)
CALCIUM SERPL-MCNC: 9.4 MG/DL (ref 8.7–10.2)
CHLORIDE SERPL-SCNC: 103 MMOL/L (ref 96–106)
CHOLEST SERPL-MCNC: 190 MG/DL (ref 100–199)
CO2 SERPL-SCNC: 22 MMOL/L (ref 20–29)
CREAT SERPL-MCNC: 0.69 MG/DL (ref 0.57–1)
EGFRCR SERPLBLD CKD-EPI 2021: 118 ML/MIN/1.73
EOSINOPHIL # BLD AUTO: 0.1 X10E3/UL (ref 0–0.4)
EOSINOPHIL NFR BLD AUTO: 1 %
ERYTHROCYTE [DISTWIDTH] IN BLOOD BY AUTOMATED COUNT: 13.6 % (ref 11.7–15.4)
GLOBULIN SER CALC-MCNC: 2.7 G/DL (ref 1.5–4.5)
GLUCOSE SERPL-MCNC: 93 MG/DL (ref 70–99)
HBA1C MFR BLD: 5.6 % (ref 4.8–5.6)
HCT VFR BLD AUTO: 39.1 % (ref 34–46.6)
HDLC SERPL-MCNC: 49 MG/DL
HGB BLD-MCNC: 12.1 G/DL (ref 11.1–15.9)
IMM GRANULOCYTES # BLD AUTO: 0 X10E3/UL (ref 0–0.1)
IMM GRANULOCYTES NFR BLD AUTO: 0 %
LDLC SERPL CALC-MCNC: 122 MG/DL (ref 0–99)
LYMPHOCYTES # BLD AUTO: 2.5 X10E3/UL (ref 0.7–3.1)
LYMPHOCYTES NFR BLD AUTO: 32 %
MCH RBC QN AUTO: 26.7 PG (ref 26.6–33)
MCHC RBC AUTO-ENTMCNC: 30.9 G/DL (ref 31.5–35.7)
MCV RBC AUTO: 86 FL (ref 79–97)
MONOCYTES # BLD AUTO: 0.6 X10E3/UL (ref 0.1–0.9)
MONOCYTES NFR BLD AUTO: 8 %
NEUTROPHILS # BLD AUTO: 4.5 X10E3/UL (ref 1.4–7)
NEUTROPHILS NFR BLD AUTO: 58 %
PLATELET # BLD AUTO: 258 X10E3/UL (ref 150–450)
POTASSIUM SERPL-SCNC: 4.6 MMOL/L (ref 3.5–5.2)
PROT SERPL-MCNC: 7.2 G/DL (ref 6–8.5)
RBC # BLD AUTO: 4.53 X10E6/UL (ref 3.77–5.28)
SODIUM SERPL-SCNC: 139 MMOL/L (ref 134–144)
T4 FREE SERPL-MCNC: 1.22 NG/DL (ref 0.82–1.77)
TRIGL SERPL-MCNC: 104 MG/DL (ref 0–149)
TSH SERPL DL<=0.005 MIU/L-ACNC: 2.33 UIU/ML (ref 0.45–4.5)
VLDLC SERPL CALC-MCNC: 19 MG/DL (ref 5–40)
WBC # BLD AUTO: 7.8 X10E3/UL (ref 3.4–10.8)